# Patient Record
Sex: FEMALE | Race: WHITE | NOT HISPANIC OR LATINO
[De-identification: names, ages, dates, MRNs, and addresses within clinical notes are randomized per-mention and may not be internally consistent; named-entity substitution may affect disease eponyms.]

---

## 2022-04-06 ENCOUNTER — APPOINTMENT (OUTPATIENT)
Dept: DERMATOLOGY | Facility: CLINIC | Age: 27
End: 2022-04-06
Payer: COMMERCIAL

## 2022-04-06 PROCEDURE — 99203 OFFICE O/P NEW LOW 30 MIN: CPT

## 2023-04-19 ENCOUNTER — APPOINTMENT (OUTPATIENT)
Dept: DERMATOLOGY | Facility: CLINIC | Age: 28
End: 2023-04-19
Payer: COMMERCIAL

## 2023-04-19 ENCOUNTER — TRANSCRIPTION ENCOUNTER (OUTPATIENT)
Age: 28
End: 2023-04-19

## 2023-04-19 PROCEDURE — 99213 OFFICE O/P EST LOW 20 MIN: CPT

## 2024-01-20 ENCOUNTER — EMERGENCY (EMERGENCY)
Facility: HOSPITAL | Age: 29
LOS: 1 days | Discharge: HOME | End: 2024-01-20
Attending: EMERGENCY MEDICINE
Payer: COMMERCIAL

## 2024-01-20 VITALS
WEIGHT: 215.39 LBS | HEART RATE: 80 BPM | RESPIRATION RATE: 18 BRPM | HEIGHT: 70 IN | OXYGEN SATURATION: 97 % | TEMPERATURE: 98 F | SYSTOLIC BLOOD PRESSURE: 136 MMHG | DIASTOLIC BLOOD PRESSURE: 78 MMHG

## 2024-01-20 PROCEDURE — 99283 EMERGENCY DEPT VISIT LOW MDM: CPT | Mod: 25

## 2024-01-20 PROCEDURE — 99284 EMERGENCY DEPT VISIT MOD MDM: CPT

## 2024-01-20 PROCEDURE — 90715 TDAP VACCINE 7 YRS/> IM: CPT

## 2024-01-20 PROCEDURE — 90471 IMMUNIZATION ADMIN: CPT

## 2024-01-20 RX ORDER — TETANUS TOXOID, REDUCED DIPHTHERIA TOXOID AND ACELLULAR PERTUSSIS VACCINE, ADSORBED 5; 2.5; 8; 8; 2.5 [IU]/.5ML; [IU]/.5ML; UG/.5ML; UG/.5ML; UG/.5ML
0.5 SUSPENSION INTRAMUSCULAR ONCE
Refills: 0 | Status: COMPLETED | OUTPATIENT
Start: 2024-01-20 | End: 2024-01-20

## 2024-01-20 RX ADMIN — TETANUS TOXOID, REDUCED DIPHTHERIA TOXOID AND ACELLULAR PERTUSSIS VACCINE, ADSORBED 0.5 MILLILITER(S): 5; 2.5; 8; 8; 2.5 SUSPENSION INTRAMUSCULAR at 19:08

## 2024-01-20 RX ADMIN — Medication 1 TABLET(S): at 19:08

## 2024-01-20 NOTE — ED PROVIDER NOTE - CLINICAL SUMMARY MEDICAL DECISION MAKING FREE TEXT BOX
28 years old female no past medical history sent from the urgent care to emergency room for animal bite. patient states about 4 PM as she as well as trying to control weight a ( assumed dead vole ) on the trash . but the vole was vole and bite her right  pinky finger- is bleeding  initially that she washed it. she had a follow-up in urgent care sent to the emergency for further evaluation rabies vaccine?   unknown last tetanus shot- denies any pregnancy- currently on her menstruation.  -Called and spoke with [place] at the [facility] we did not recommended rabies vaccine. patient removed information obtained by department of the health  - we will update the tetanus given antibiotics 28 years old female no past medical history sent from the urgent care to emergency room for animal bite. patient states about 4 PM as she as well as trying to through away  a ( assumed dead vole ) , but the vole was alive and bite her right  pinky finger- is bleeding  initially that she washed it with peroxide . she had a follow-up in urgent care sent to the emergency for further evaluation rabies vaccine?   unknown last tetanus shot- denies any pregnancy- currently on her menstruation.  -Called and spoke with Socorro at the department of  health,  they did not recommended rabies vaccine due to low suspecting rabies . patient fillup the information obtained by department of the health.   - we will update the tetanus and Augmentin

## 2024-01-20 NOTE — ED PROVIDER NOTE - OBJECTIVE STATEMENT
28 years old female no past medical history sent from the urgent care to emergency room for animal bite. patient states about 4 PM as she as well as trying to control weight a ( assumed dead vole ) on the trash . but the vole was vole and bite her right  pinky finger- is bleeding  initially that she washed it. she had a follow-up in urgent care sent to the emergency for further evaluation rabies vaccine?   unknown last tetanus shot- denies any pregnancy- currently on her menstruation 28 years old female no past medical history sent from the urgent care to emergency room for animal bite. patient states about 4 PM as she as well as trying to through away  a ( assumed dead vole ) , but the vole was alive and bite her right  pinky finger- is bleeding  initially that she washed it with peroxide . she had a follow-up in urgent care sent to the emergency for further evaluation rabies vaccine?   unknown last tetanus shot- denies any pregnancy- currently on her menstruation

## 2024-01-20 NOTE — ED PROVIDER NOTE - PHYSICAL EXAMINATION
Const: AOX3 nontoxic appearing, no apparent respiratory or physical distress. Stable gait   HEENT: NC/AT. Moist mucous membranes.  Eyes: DONELL. EOMI  Neck: Soft and supple. Full ROM without pain.  Resp: Speaking in full sentences. No evidence of respiratory distress.    right hand range of motion grossly intact  Skin:  small puncture wound less than 0.25 centimeters on distal left fifth phalanx noted on the palmar aspect. no redness no bleeding no open wound noted  Neuro: Awake, alert & oriented x 3. Moves all extremities symmetrically.

## 2024-01-20 NOTE — ED ADULT NURSE NOTE - OBJECTIVE STATEMENT
Assumed care of pt at 1820 in . Pt A&Ox4 c/o bite from a vole. Pt state she was in her yard and the vole bit her. pt went to urgent care was sent in to see if they needed a rabies shot.

## 2024-01-20 NOTE — ED PROVIDER NOTE - NSFOLLOWUPINSTRUCTIONS_ED_ALL_ED_FT
take the antibiotics as prescribed  - Department of Health will call you to monitor your symptoms  Animal Bite, Adult    Animal bite wounds can be mild or serious. It is important to get medical treatment to prevent infection. Ask your doctor if you need treatment to prevent an infection that can spread from animals to humans (rabies).    Follow these instructions at home:      Wound care     Follow instructions from your doctor about how to take care of your wound. Make sure you:    Wash your hands with soap and water before you change your bandage (dressing). If you cannot use soap and water, use hand .  Change your bandage as told by your doctor.  Leave stitches (sutures), skin glue, or skin tape (adhesive) strips in place. They may need to stay in place for 2 weeks or longer. If tape strips get loose and curl up, you may trim the loose edges. Do not remove tape strips completely unless your doctor says it is okay.  Check your wound every day for signs of infection. Check for:    More redness, swelling, or pain.  More fluid or blood.  Warmth.  Pus or a bad smell.        Medicines    Take or apply over-the-counter and prescription medicines only as told by your doctor.  If you were prescribed an antibiotic, take or apply it as told by your doctor. Do not stop using the antibiotic even if your wound gets better.        General instructions     Keep the injured area raised (elevated) above the level of your heart while you are sitting or lying down.  If directed, put ice on the injured area.    Put ice in a plastic bag.  Place a towel between your skin and the bag.  Leave the ice on for 20 minutes, 2–3 times per day.  Keep all follow-up visits as told by your doctor. This is important.    Contact a doctor if:  You have more redness, swelling, or pain around your wound.  Your wound feels warm to the touch.  You have a fever or chills.  You have a general feeling of sickness (malaise).  You feel sick to your stomach (nauseous).  You throw up (vomit).  You have pain that does not get better.    Get help right away if:  You have a red streak going away from your wound.  You have any of these coming from your wound:    Non-clear fluid.  More blood.  Pus or a bad smell.  You have trouble moving your injured area.  You lose feeling (have numbness) or feel tingling anywhere on your body.    Summary  It is important to get the right medical treatment for animal bites. Treatment can help you to not get an infection. Ask your doctor if you need treatment to prevent an infection that can spread from animals to humans (rabies).  Check your wound every day for signs of infection, such as more redness or swelling instead of less.  If you have a red streak going away from your wound, get medical help right away.    ADDITIONAL NOTES AND INSTRUCTIONS    Please follow up with your Primary MD in 24-48 hr.  Seek immediate medical care for any new/worsening signs or symptoms.

## 2024-01-20 NOTE — ED PROVIDER NOTE - ATTENDING APP SHARED VISIT CONTRIBUTION OF CARE
I, Ivan Sue MD, performed the initial face to face bedside interview with this patient regarding history of present illness, review of symptoms and relevant past medical, social and family history.  I completed an independent physical examination.  I was the initial provider who evaluated this patient. I have signed out the follow up of any pending tests (i.e. labs, radiological studies) to the ACP.  I have communicated the patient’s plan of care and disposition with the ACP.

## 2024-01-20 NOTE — ED PROVIDER NOTE - BIRTH SEX
[facility] Counseling: I discussed with the patient the risks of tralokinumab including but not limited to eye infection and irritation, cold sores, injection site reactions, worsening of asthma, allergic reactions and increased risk of parasitic infection.  Live vaccines should be avoided while taking tralokinumab. The patient understands that monitoring is required and they must alert us or the primary physician if symptoms of infection or other concerning signs are noted. Unknown

## 2024-04-14 ENCOUNTER — NON-APPOINTMENT (OUTPATIENT)
Age: 29
End: 2024-04-14

## 2024-04-15 ENCOUNTER — APPOINTMENT (OUTPATIENT)
Dept: DERMATOLOGY | Facility: CLINIC | Age: 29
End: 2024-04-15
Payer: COMMERCIAL

## 2024-04-15 PROBLEM — Z78.9 OTHER SPECIFIED HEALTH STATUS: Chronic | Status: ACTIVE | Noted: 2024-01-20

## 2024-04-15 PROCEDURE — 99213 OFFICE O/P EST LOW 20 MIN: CPT

## 2025-04-15 ENCOUNTER — NON-APPOINTMENT (OUTPATIENT)
Age: 30
End: 2025-04-15

## 2025-04-16 ENCOUNTER — APPOINTMENT (OUTPATIENT)
Dept: DERMATOLOGY | Facility: CLINIC | Age: 30
End: 2025-04-16
Payer: COMMERCIAL

## 2025-04-16 PROCEDURE — 99213 OFFICE O/P EST LOW 20 MIN: CPT
